# Patient Record
Sex: MALE | Race: WHITE | NOT HISPANIC OR LATINO | Employment: OTHER | URBAN - METROPOLITAN AREA
[De-identification: names, ages, dates, MRNs, and addresses within clinical notes are randomized per-mention and may not be internally consistent; named-entity substitution may affect disease eponyms.]

---

## 2017-01-13 ENCOUNTER — SLEEP CENTER VISIT (OUTPATIENT)
Dept: SLEEP MEDICINE | Facility: MEDICAL CENTER | Age: 62
End: 2017-01-13
Payer: COMMERCIAL

## 2017-01-13 VITALS
RESPIRATION RATE: 16 BRPM | DIASTOLIC BLOOD PRESSURE: 78 MMHG | HEART RATE: 79 BPM | WEIGHT: 248 LBS | BODY MASS INDEX: 38.92 KG/M2 | HEIGHT: 67 IN | SYSTOLIC BLOOD PRESSURE: 110 MMHG

## 2017-01-13 DIAGNOSIS — G47.33 OSA (OBSTRUCTIVE SLEEP APNEA): ICD-10-CM

## 2017-01-13 PROCEDURE — 99203 OFFICE O/P NEW LOW 30 MIN: CPT | Performed by: INTERNAL MEDICINE

## 2017-01-13 RX ORDER — ZOLPIDEM TARTRATE 5 MG/1
TABLET ORAL
Qty: 3 TAB | Refills: 0 | Status: CANCELLED | OUTPATIENT
Start: 2017-01-13

## 2017-01-13 NOTE — PROGRESS NOTES
Chief Complaint   Patient presents with   • New Patient     Sleep evaluation       HPI: This patient is a 61 y.o. Male from [place], [place], who is referred by his PCP for sleep apnea evaluation. He has had snoring and fatigue for many years, with overnight oximetry in June 2016 showing cyclical desaturations below 90% for 93% of the night to a silvia of 53%. He is unaware of apneas. The patient describes frequent nighttime awakening secondary to nocturia and is pending urology evaluation for symptoms. He goes to bed around 9 PM, falling asleep readily, with approximately 5 awakenings nightly. He gets up by 4:30 AM feeling relatively rested however feels fatigued through the day. His [place] Sleepiness Scale score is 9. His BMI is 38. Denies tobacco, marijuana, or excessive alcohol use.      Past Medical History   Diagnosis Date   • CAD (coronary artery disease) 9/23/2011     Based on an abnormal Ca score.    • HTN (hypertension)     • Dyslipidemia     • Left ventricular hypertrophy    • Nephrolithiasis    • Kidney stones      Lithotripsy   • Back pain    • Obesity    • Kidney stone    • Tonsillitis        Social History     Social History   • Marital Status:      Spouse Name: N/A   • Number of Children: N/A   • Years of Education: N/A     Occupational History   • Not on file.     Social History Main Topics   • Smoking status: Former Smoker -- 1.00 packs/day for 5 years   • Smokeless tobacco: Not on file      Comment: Quit 1985   • Alcohol Use: No   • Drug Use: No   • Sexual Activity: Not on file      Comment: , has 4 children, works as [employer] .     Other Topics Concern   • Not on file     Social History Narrative       Family History   Problem Relation Age of Onset   • Other Neg Hx        Current Outpatient Prescriptions on File Prior to Visit   Medication Sig Dispense Refill   • rosuvastatin (CRESTOR) 10 MG Tab Take 1 Tab by mouth every evening. 90 Tab 3   • lisinopril (PRINIVIL) 10 MG  "TABS Take 10 mg by mouth every day.     • Multiple Vitamins-Minerals (MULTI COMPLETE PO) Take  by mouth.     • POTASSIUM CITRATE PO Take  by mouth every day.     • aspirin 81 MG tablet Take 81 mg by mouth every day.     • tamsulosin (FLOMAX) 0.4 MG capsule Take 1 Cap by mouth ONE-HALF HOUR AFTER DINNER. (Patient not taking: Reported on 1/13/2017) 30 Cap 0   • naproxen (NAPROSYN) 500 MG TABS Take 500 mg by mouth 2 times a day, with meals.       No current facility-administered medications on file prior to visit.       Allergies: Review of patient's allergies indicates no known allergies.    ROS:   Constitutional: Denies fevers, chills, night sweats, fatigue or weight loss  Eyes: Denies vision loss, pain, drainage, double vision  Ears, Nose, Throat: Denies earache, difficulty hearing, tinnitus, nasal congestion, hoarseness  Cardiovascular: Denies chest pain, tightness, palpitations, orthopnea or edema  Respiratory: Denies shortness of breath, cough, wheezing, hemoptysis  Sleep: As in HPI  GI: Denies heartburn, dysphagia, nausea, abdominal pain, diarrhea or constipation  : +urination, denies hematuria, discharge or painful urination  Musculoskeletal: Denies back pain, painful joints, +sore muscles  Neurological: Denies weakness or headaches  Skin: No rashes    Blood pressure 110/78, pulse 79, resp. rate 16, height 1.702 m (5' 7\"), weight 112.492 kg (248 lb).  Multi-Ox Readings  Multi Ox #1     O2 sat % at rest     O2 sat % on exertion     O2 sat average on exertion     Multi Ox #2     O2 sat % at rest     O2 sat % on exertion     O2 sat average on exertion       Oxygen Use     Oxygen Frequency     Duration of need     Is the patient mobile within the home?     CPAP Use?     BIPAP Use?     Servo Titration         Physical Exam:  Appearance: Well-nourished, well-developed, in no acute distress  HEENT: Normocephalic, atraumatic, white sclera, PERRLA, oropharynx clear, Mallampati 4  Neck: No adenopathy or " masses  Respiratory: no intercostal retractions or accessory muscle use  Lungs auscultation: Clear to auscultation bilaterally  Cardiovascular: Regular rate rhythm. No murmurs, rubs or gallops.  No LE edema  Abdomen: soft, obese  Gait: Normal  Digits: No clubbing, cyanosis  Motor: No focal deficits  Orientation: Oriented to time, person and place    Diagnosis:  1. MACEY (obstructive sleep apnea)  OVERNIGHT HOME SLEEP STUDY   2. BMI 38.0-38.9,adult         Plan:  The patient has snoring, nocturnal desaturations, in the setting of medically significant excessive weight and a crowded airway, with very high clinical suspicion for obstructive sleep apnea syndrome. He also has nocturia which is disrupting sleep. We discussed the pathophysiology of sleep apnea as well as treatment options. As he lives in [place], [place] we will arrange for overnight home polysomnography and he will call for test results. My suspicion is that he will require CPAP therapy. He was also encouraged to follow up with urology regarding nocturia. We will attempt to coordinate his medical appointments to minimize his commuting.  Thank you for the opportunity to assist in Javon's care.

## 2017-01-13 NOTE — MR AVS SNAPSHOT
"        Javon Roa   2017 8:00 AM   Sleep Center Visit   MRN: [id number]    Department:  [facility]   Dept Phone:  [phone]    Description:  Male : 1955   Provider:  Teresa Lou M.D.           Reason for Visit     New Patient Sleep evaluation      Allergies as of 2017     No Known Allergies      You were diagnosed with     MACEY (obstructive sleep apnea)   [275466]       BMI 38.0-38.9,adult   [048040]         Vital Signs     Blood Pressure Pulse Respirations Height Weight Body Mass Index    110/78 mmHg 79 16 1.702 m (5' 7\") 112.492 kg (248 lb) 38.83 kg/m2    Smoking Status                   Former Smoker           Basic Information     Date Of Birth Sex Race Ethnicity Preferred Language    1955 Male Other Unknown English      Problem List              ICD-10-CM Priority Class Noted - Resolved    CAD (coronary artery disease) (Chronic) I25.10   2011 - Present    Dyslipidemia (Chronic) E78.5   2011 - Present    Left ventricular hypertrophy (Chronic) I51.7   Unknown - Present    Coronary artery calcification seen on CAT scan I25.10   2013 - Present    Obesity E66.9   2013 - Present    Essential hypertension, benign (Chronic) I10   7/15/2016 - Present    Pancreatic mass K86.9 Medium  10/20/2016 - Present    Nephrolithiasis N20.0 Medium  10/20/2016 - Present    Bilirubinemia E80.6 Medium  10/20/2016 - Present    Hyponatremia E87.1 Medium  10/20/2016 - Present      Health Maintenance        Date Due Completion Dates    IMM DTaP/Tdap/Td Vaccine (1 - Tdap) 1974 ---    COLONOSCOPY 2005 ---    IMM ZOSTER VACCINE 2015 ---    IMM INFLUENZA (1) 2016 ---            Current Immunizations     No immunizations on file.      Below and/or attached are the medications your provider expects you to take. Review all of your home medications and newly ordered medications with your provider and/or pharmacist. Follow medication instructions as directed by your " provider and/or pharmacist. Please keep your medication list with you and share with your provider. Update the information when medications are discontinued, doses are changed, or new medications (including over-the-counter products) are added; and carry medication information at all times in the event of emergency situations     Allergies:  No Known Allergies          Medications  Valid as of: January 13, 2017 -  8:58 AM    Generic Name Brand Name Tablet Size Instructions for use    Aspirin (Tab) aspirin 81 MG Take 81 mg by mouth every day.        Lisinopril (Tab) PRINIVIL 10 MG Take 10 mg by mouth every day.        Multiple Vitamins-Minerals   Take  by mouth.        Naproxen (Tab) NAPROSYN 500 MG Take 500 mg by mouth 2 times a day, with meals.        Potassium Citrate   Take  by mouth every day.        Rosuvastatin Calcium (Tab) CRESTOR 10 MG Take 1 Tab by mouth every evening.        Tamsulosin HCl (Cap) FLOMAX 0.4 MG Take 1 Cap by mouth ONE-HALF HOUR AFTER DINNER.        .                 Medicines prescribed today were sent to:     [facility] - [place] [address] [address] [facility] [address]    Phone: [phone] Fax: [phone]    Open 24 Hours?: No    [facility] - [place] [address] [address]    Phone: [phone] Fax: [phone]    Open 24 Hours?: No      Medication refill instructions:       If your prescription bottle indicates you have medication refills left, it is not necessary to call your provider’s office. Please contact your pharmacy and they will refill your medication.    If your prescription bottle indicates you do not have any refills left, you may request refills at any time through one of the following ways: The online [employer] system (except Urgent Care), by calling your provider’s office, or by asking your pharmacy to contact your provider’s office with a refill request. Medication refills are processed only during  regular business hours and may not be available until the next business day. Your provider may request additional information or to have a follow-up visit with you prior to refilling your medication.   *Please Note: Medication refills are assigned a new Rx number when refilled electronically. Your pharmacy may indicate that no refills were authorized even though a new prescription for the same medication is available at the pharmacy. Please request the medicine by name with the pharmacy before contacting your provider for a refill.        Your To Do List     Future Labs/Procedures Complete By Expires    OVERNIGHT HOME SLEEP STUDY  As directed 1/13/2018    Comments:    Pt lives in [place], [place]  Pls call for results         [employer] Access Code: [email or web address]  Expires: 2/12/2017  8:58 AM    [employer]  A secure, online tool to manage your health information     [employer]® is a secure, online tool that connects you to your personalized health information from the privacy of your home -- day or night - making it very easy for you to manage your healthcare. Once the activation process is completed, you can even access your medical information using the [employer] maryam, which is available for free in the Apple Maryam store or Google Play store.     [employer] provides the following levels of access (as shown below):   My Chart Features   [facility] Doctor [facility]  Specialists [facility]  Urgent  Care Non-[facility]  Primary Care  Doctor   Email your healthcare team securely and privately 24/7 X X X    Manage appointments: schedule your next appointment; view details of past/upcoming appointments X      Request prescription refills. X      View recent personal medical records, including lab and immunizations X X X X   View health record, including health history, allergies, medications X X X X   Read reports about your outpatient visits, procedures, consult and ER notes X X X X   See your discharge summary, which is a  recap of your hospital and/or ER visit that includes your diagnosis, lab results, and care plan. X X       How to register for [employer]DA:  1. Go to  [email or web address].  2. Click on the Sign Up Now box, which takes you to the New Member Sign Up page. You will need to provide the following information:  a. Enter your BDA Access Code exactly as it appears at the top of this page. (You will not need to use this code after you’ve completed the sign-up process. If you do not sign up before the expiration date, you must request a new code.)   b. Enter your date of birth.   c. Enter your home email address.   d. Click Submit, and follow the next screen’s instructions.  3. Create a BDA ID. This will be your BDA login ID and cannot be changed, so think of one that is secure and easy to remember.  4. Create a BDA password. You can change your password at any time.  5. Enter your Password Reset Question and Answer. This can be used at a later time if you forget your password.   6. Enter your e-mail address. This allows you to receive e-mail notifications when new information is available in BDA.  7. Click Sign Up. You can now view your health information.    For assistance activating your BDA account, call [phone]

## 2017-07-14 ENCOUNTER — TELEPHONE (OUTPATIENT)
Dept: PULMONOLOGY | Facility: HOSPICE | Age: 62
End: 2017-07-14

## 2017-07-14 DIAGNOSIS — G47.33 OSA (OBSTRUCTIVE SLEEP APNEA): ICD-10-CM

## 2017-07-14 NOTE — TELEPHONE ENCOUNTER
Patient lives in [place], [place] and had overnight pulse oximetry done 1/24/17. He is finally calling for results. (they are faxed into the media tab) the results are ready for review. Patient is calling for results.

## 2017-07-15 NOTE — TELEPHONE ENCOUNTER
He has very severe sleep apnea and low oxygen and recommended to start CPAP therapy as discussed with Dr. Dasha chacon OV. It would be advisable that he complete a sleep study in our clinic to better treat patient, because I suspect he will need additional oxygen and/or CPAP therapy may not be enough. He may in fact need BIPAP.  If he is amendable I can place order to get sleep study and follow up visit the very next day with results due to his commute

## 2017-07-18 NOTE — TELEPHONE ENCOUNTER
"I spoke with patient and he is agreement to come to [facility] and do an \"In Lab sleep study with result appointment the next day. Instructions per Delma GRIGGS. I called and spoke to Genoveva HILL at the sleep center. She will help us get this patient scheduled. She has placed order to [facility] group for signed orders. Message routed to [facility] for signature.  "

## 2017-07-19 NOTE — TELEPHONE ENCOUNTER
Orders placed. Please remind scheduling to make follow up visit, the day after his sleep study due to commute.  F/U visit must be in the afternoon in order for the sleep study to be reviewed with data completely

## 2017-07-20 NOTE — TELEPHONE ENCOUNTER
Message forwarded to Genoveva PERES for help scheduling Sleep Study with next day follow up  Results. (refer to Delma Diana's instructions) Orders placed.

## 2017-10-08 ENCOUNTER — SLEEP STUDY (OUTPATIENT)
Dept: SLEEP MEDICINE | Facility: MEDICAL CENTER | Age: 62
End: 2017-10-08
Attending: INTERNAL MEDICINE
Payer: COMMERCIAL

## 2017-10-08 DIAGNOSIS — G47.33 OSA (OBSTRUCTIVE SLEEP APNEA): ICD-10-CM

## 2017-10-08 PROCEDURE — 95811 POLYSOM 6/>YRS CPAP 4/> PARM: CPT | Performed by: INTERNAL MEDICINE

## 2017-10-09 ENCOUNTER — SLEEP CENTER VISIT (OUTPATIENT)
Dept: SLEEP MEDICINE | Facility: MEDICAL CENTER | Age: 62
End: 2017-10-09
Payer: COMMERCIAL

## 2017-10-09 VITALS
SYSTOLIC BLOOD PRESSURE: 110 MMHG | OXYGEN SATURATION: 95 % | TEMPERATURE: 98.2 F | WEIGHT: 254 LBS | HEIGHT: 67 IN | DIASTOLIC BLOOD PRESSURE: 76 MMHG | RESPIRATION RATE: 16 BRPM | BODY MASS INDEX: 39.87 KG/M2 | HEART RATE: 77 BPM

## 2017-10-09 DIAGNOSIS — G47.33 OSA (OBSTRUCTIVE SLEEP APNEA): ICD-10-CM

## 2017-10-09 PROCEDURE — 99213 OFFICE O/P EST LOW 20 MIN: CPT | Performed by: NURSE PRACTITIONER

## 2017-10-09 NOTE — PROCEDURES
Comments:  The patient underwent a diagnostic polysomnogram using the standard montage for measurement of parameters of sleep, respiratory events, movement abnormalities, and heart rate and rhythm.    A microphone was used to monitor snoring.  Interpretation:  Study start time was 09:39:47 PM.  Diagnostic recording time was 7h 44.5m with a total sleep time of 6h 12.5m resulting in a sleep efficiency of 80.19%%.    Sleep latency from the start of the study was 06 minutes and the latency from sleep to REM was 61 minutes.  In total, 90 arousals were scored for an arousal index of 14.5.  Respiratory:  There were a total of 25 apneas consisting of 2 obstructive apneas, 7 mixed apneas, and 16 central apneas.  A total of 59 hypopneas were scored.  The apnea index was 4.03 per hour and the hypopnea index was 9.50 per hour resulting in an overall AHI of 13.53.  AHI during rem was 14.07 and AHI while supine was 20.50.  Oximetry:  There was a mean oxygen saturation of 90.0% with a minimum oxygen saturation of 72.0%.  Time spent with oxygen saturations below 89% was 105.9 minutes.  Cardiac:  The highest heart rate seen while awake was 91 BPM while the highest heart rate during sleep was 87 BPM with an average sleeping heart rate of 70 BPM.  Limb Movements:  There were a total of 173 PLMs during sleep, of which 15 were PLMS arousals.  This resulted in a PLMS index of 27.9 and a PLMS arousal index of 2.4.     CPAP was tried from 5 to 13 cm H2O.    Narration:    This was a positive airway pressure titration overnight polysomnogram. The sleep stage durations revealed a reduced amount of stage III sleep. The lowest saturation during sleep was 72%. The technician initiated therapy with CPAP at 5 cm of water pressure and gradually increased the pressure to a maximum of 15 cm. The patient appeared to do best on CPAP at 14 cm water where REM sleep was achieved, the apnea hypopnea index was 4.9, and saturations were never less than 80%.  The patient did not do as well on CPAP at 15 cm water nor is well on CPAP at 13 cm water.    Recommendation:    Recommend auto titrating CPAP 12-17 cm water using a mask of choice and heated humidification followed by clinical and data card review. During the CPAP titration, the patient chose to use an AirFit   S mask with heated humidification

## 2017-10-09 NOTE — PROGRESS NOTES
CC:  Here for f/u sleep issues as listed below    HPI:   Javon presents today for follow up obstructive sleep apnea and sleep study results.  Novasom sleep study from 1/2017 indicated an AHI of 67.3 and 71.8 and low oxygenation less than 70% both nights.  He followed up with a titration study 10/2017 that was trialed on CPAP pressures from 5-15. He did best on CPAP pressure of 14 with reduced AHI to 4.9 and REM rebound. He continued to have desaturations below 90% for 21 minutes with this pressure and qualifies for additional oxygen. Reviewed results and patient is amendable to CPAP therapy. They understand they may need a future sleep study if treatment is ineffective.   Patient is currently sleeping 6 hours per night with 2-3 nighttime awakenings. They have no trouble falling asleep. They sometimes feel refreshed in the morning and occasionally morning H/A. They feel tired throughout the day and falls asleep watching TV for appx 10-15 minutes long.  Patient reports snoring, apnea events and paroxysmal nocturnal dyspnea events. They have never fallen asleep in conversation or at work. Had accident appx 2 years ago due to falling asleep.  They deny sleepwalking/talking.       Patient Active Problem List    Diagnosis Date Noted   • Pancreatic mass 10/20/2016     Priority: Medium   • Nephrolithiasis 10/20/2016     Priority: Medium   • Bilirubinemia 10/20/2016     Priority: Medium   • Hyponatremia 10/20/2016     Priority: Medium   • MACEY (obstructive sleep apnea) 10/09/2017   • BMI 39.0-39.9,adult 10/09/2017   • Essential hypertension, benign 07/15/2016   • Coronary artery calcification seen on CAT scan 08/16/2013   • Obesity 08/16/2013   • Left ventricular hypertrophy    • CAD (coronary artery disease) 09/23/2011   • Dyslipidemia 09/23/2011       Past Medical History:   Diagnosis Date   • CAD (coronary artery disease) 9/23/2011    Based on an abnormal Ca score.    • Back pain    • Dyslipidemia     • HTN (hypertension)      • Kidney stone    • Kidney stones     Lithotripsy   • Left ventricular hypertrophy    • Nephrolithiasis    • Obesity    • Tonsillitis        Past Surgical History:   Procedure Laterality Date   • LITHOTRIPSY      Dr. Jose Garcia, urologist   • TONSILLECTOMY         Family History   Problem Relation Age of Onset   • Other Neg Hx        Social History     Social History   • Marital status:      Spouse name: N/A   • Number of children: N/A   • Years of education: N/A     Occupational History   • Not on file.     Social History Main Topics   • Smoking status: Former Smoker     Packs/day: 1.00     Years: 5.00     Quit date: 1/1/1985   • Smokeless tobacco: Never Used      Comment: Quit 1985   • Alcohol use No   • Drug use: No   • Sexual activity: Not on file      Comment: , has 4 children, works as [employer] .     Other Topics Concern   • Not on file     Social History Narrative   • No narrative on file       Current Outpatient Prescriptions   Medication Sig Dispense Refill   • tamsulosin (FLOMAX) 0.4 MG capsule Take 1 Cap by mouth ONE-HALF HOUR AFTER DINNER. (Patient not taking: Reported on 1/13/2017) 30 Cap 0   • rosuvastatin (CRESTOR) 10 MG Tab Take 1 Tab by mouth every evening. 90 Tab 3   • lisinopril (PRINIVIL) 10 MG TABS Take 10 mg by mouth every day.     • naproxen (NAPROSYN) 500 MG TABS Take 500 mg by mouth 2 times a day, with meals.     • Multiple Vitamins-Minerals (MULTI COMPLETE PO) Take  by mouth.     • POTASSIUM CITRATE PO Take  by mouth every day.     • aspirin 81 MG tablet Take 81 mg by mouth every day.       No current facility-administered medications for this visit.           Allergies: Review of patient's allergies indicates no known allergies.      ROS   Gen: Denies fever, chills, unintentional weight loss, fatigue  Resp:Denies Dyspnea  CV: Denies chest pain, chest tightness  Sleep:Denies morning headache, insomnia,   Neuro: Denies frequent headaches, weakness,  "dizziness  See HPI.  All other systems reviewed and negative        Vital signs for this encounter:  Vitals:    10/09/17 1119   Height: 1.702 m (5' 7\")   Weight: 115.2 kg (254 lb)   Weight % change since last entry.: 0 %   BP: 110/76   Pulse: 77   BMI (Calculated): 39.78   Resp: 16   Temp: 36.8 °C (98.2 °F)   O2 sat % room air: 95 %                   Physical Exam:   Gen:         Alert and oriented, No apparent distress.   Neck:        No Lymphadenopathy.  Lungs:     Clear to auscultation bilaterally.    CV:          Regular rate and rhythm. No murmurs, rubs or gallops.   Abd:         Soft non tender, non distended.            Ext:          No clubbing, cyanosis, edema.    Assessment   1. MACEY (obstructive sleep apnea)  DME CPAP   2. BMI 39.0-39.9,adult         PLAN:   Patient Instructions   1) Start CPAP at 10mmI60 with 2L of oxygen  2) Discussed acclimating to machine and change mask within first 30 days if required. Bring machine to first appointment.  3) Vaccines: Flu recommended  4) Return in about 6 weeks (around 11/20/2017) for Compliance, review of symptoms, if not sooner, follow up with ANTHONY Alejandre.          "

## 2017-10-09 NOTE — PATIENT INSTRUCTIONS
1) Start CPAP at [id number] with 2L of oxygen  2) Discussed acclimating to machine and change mask within first 30 days if required. Bring machine to first appointment.  3) Vaccines: Flu recommended  4) Return in about 6 weeks (around 11/20/2017) for Compliance, review of symptoms, if not sooner, follow up with ANTHONY Alejandre.

## 2017-11-21 ENCOUNTER — SLEEP CENTER VISIT (OUTPATIENT)
Dept: SLEEP MEDICINE | Facility: MEDICAL CENTER | Age: 62
End: 2017-11-21
Payer: COMMERCIAL

## 2017-11-21 VITALS
WEIGHT: 258 LBS | HEIGHT: 67 IN | DIASTOLIC BLOOD PRESSURE: 60 MMHG | HEART RATE: 63 BPM | BODY MASS INDEX: 40.49 KG/M2 | SYSTOLIC BLOOD PRESSURE: 136 MMHG | RESPIRATION RATE: 18 BRPM | OXYGEN SATURATION: 93 % | TEMPERATURE: 98.2 F

## 2017-11-21 DIAGNOSIS — G47.33 OBSTRUCTIVE SLEEP APNEA SYNDROME: ICD-10-CM

## 2017-11-21 DIAGNOSIS — G47.10 HYPERSOMNOLENCE: ICD-10-CM

## 2017-11-21 PROCEDURE — 99214 OFFICE O/P EST MOD 30 MIN: CPT | Performed by: INTERNAL MEDICINE

## 2017-12-05 NOTE — PROGRESS NOTES
CC:  Daytime somnolence, sleep apnea hypopnea syndrome.    HPI:   Mr. Roa has been evaluated here for snoring and excessive daytime somnolence.  Home sleep testing in January 2017 demonstrated an apnea hypopnea index of 67-71.8 events per hour with arterial oxygen saturation is low as 70% on room air.  A titration polysomnogram in October 2017 demonstrated a good response to CPAP at 14 cm water pressure with a residual apnea hypopnea index was 4.9 events per hour.  Some residual hypoxemia is noted on optimal CPAP therapy and supplemental oxygen was added as well.    He has acclimated well to treatment and is using the CPAP and oxygen each night, throughout the night.  He is not having unusual problems with mask fit using a Simplus fullface mask, or airway dryness.  He enjoys improved levels of daytime alertness and is not napping or falling asleep inappropriately.    The CPAP data recording chip information is reviewed with the patient.  It demonstrates use on each of the last 34 days with an average daily usage of 7 hours and 10 minutes.  Leak is moderate and the estimated residual apnea hypopnea index is 3.6 events per hour.        Patient Active Problem List    Diagnosis Date Noted   • Pancreatic mass 10/20/2016     Priority: Medium   • Nephrolithiasis 10/20/2016     Priority: Medium   • Bilirubinemia 10/20/2016     Priority: Medium   • Hyponatremia 10/20/2016     Priority: Medium   • MACEY (obstructive sleep apnea) 10/09/2017   • BMI 39.0-39.9,adult 10/09/2017   • Essential hypertension, benign 07/15/2016   • Coronary artery calcification seen on CAT scan 08/16/2013   • Obesity 08/16/2013   • Left ventricular hypertrophy    • CAD (coronary artery disease) 09/23/2011   • Dyslipidemia 09/23/2011       Past Medical History:   Diagnosis Date   • Back pain    • CAD (coronary artery disease) 9/23/2011    Based on an abnormal Ca score.    • Dyslipidemia     • HTN (hypertension)     • Kidney stone    • Kidney stones   "   Lithotripsy   • Left ventricular hypertrophy    • Nephrolithiasis    • Obesity    • Tonsillitis        Past Surgical History:   Procedure Laterality Date   • LITHOTRIPSY      Dr. Jose Garcia, urologist   • TONSILLECTOMY         Family History   Problem Relation Age of Onset   • Other Neg Hx        Social History     Social History   • Marital status:      Spouse name: N/A   • Number of children: N/A   • Years of education: N/A     Occupational History   • Not on file.     Social History Main Topics   • Smoking status: Former Smoker     Packs/day: 1.00     Years: 5.00     Quit date: 1/1/1985   • Smokeless tobacco: Never Used      Comment: Quit 1985   • Alcohol use No   • Drug use: No   • Sexual activity: Not on file      Comment: , has 4 children, works as [email or web address] .     Other Topics Concern   • Not on file     Social History Narrative   • No narrative on file       Current Outpatient Prescriptions   Medication Sig Dispense Refill   • rosuvastatin (CRESTOR) 10 MG Tab Take 1 Tab by mouth every evening. 90 Tab 3   • lisinopril (PRINIVIL) 10 MG TABS Take 10 mg by mouth every day.     • Multiple Vitamins-Minerals (MULTI COMPLETE PO) Take  by mouth.     • aspirin 81 MG tablet Take 81 mg by mouth every day.     • tamsulosin (FLOMAX) 0.4 MG capsule Take 1 Cap by mouth ONE-HALF HOUR AFTER DINNER. (Patient not taking: Reported on 11/21/2017) 30 Cap 0     No current facility-administered medications for this visit.     \"CURRENT RX\"      Allergies: Patient has no known allergies.      ROS  Reviewed and unchanged from prior.      Physical Exam:   /60   Pulse 63   Temp 36.8 °C (98.2 °F)   Resp 18   Ht 1.702 m (5' 7\")   Wt 117 kg (258 lb)   SpO2 93%   BMI 40.41 kg/m²    Head and neck examination demonstrates no mucosal lesion, purulent drainage or evident polyps. The pharynx is benign with a Mallampati III presentation. The neck is supple without thyromegaly. On chest examination there " are symmetrical bilateral breath sounds without rales, wheezing or consolidation. On cardiac examination, the apical impulse and heart sounds are normal and the rhythm is regular. There is no murmur, gallop or rub and no jugular venous distention. The abdomen is soft with active bowel sounds and no palpable hepatosplenomegaly, mass, guarding or rebound. The extremities show no clubbing, cyanosis or edema and no signs of deep venous thrombosis. There is no warmth, redness, tenderness or palpable venous cord in the calves. The skin is clear, warm and dry. There is no unusual peripheral lymphadenopathy. Peripheral pulses are palpable in all 4 extremities. On neurologic examination, cranial nerve function is intact, motor tone is symmetrical, and the patient is alert, oriented and responsive.       Problems:  1. Obstructive sleep apnea syndrome  He has severe obstructive sleep apnea hypopnea with an apnea hypopnea index of about 70 events per hour and arterial oxygen saturation as low as 70% on room air.  He is acclimated well to CPAP treatment.  He is using the device with oxygen each night, throughout the night, as confirmed by the data recording chip.  He enjoys improve levels of daytime alertness.    2. Hypersomnolence  Improved.      Plan:   1.  Continue CPAP at 14 cm water pressure with option at 2 L/m.  A prescription is written for a new Simplus fullface mask and supplies as needed.    2.  Continuous nocturnal oximetry will be arranged on CPAP and oxygen therapy confirm preservation a saturation at night.    3.  Return visit here in about 6 months, bringing the CPAP data recording chip with him.    We appreciate the opportunity to assist in his care.

## 2018-05-24 ENCOUNTER — APPOINTMENT (OUTPATIENT)
Dept: SLEEP MEDICINE | Facility: MEDICAL CENTER | Age: 63
End: 2018-05-24
Payer: COMMERCIAL

## 2018-06-15 ENCOUNTER — SLEEP CENTER VISIT (OUTPATIENT)
Dept: SLEEP MEDICINE | Facility: MEDICAL CENTER | Age: 63
End: 2018-06-15
Payer: COMMERCIAL

## 2018-06-15 VITALS
SYSTOLIC BLOOD PRESSURE: 118 MMHG | DIASTOLIC BLOOD PRESSURE: 70 MMHG | BODY MASS INDEX: 41.44 KG/M2 | WEIGHT: 264 LBS | HEART RATE: 85 BPM | RESPIRATION RATE: 16 BRPM | HEIGHT: 67 IN | OXYGEN SATURATION: 92 %

## 2018-06-15 DIAGNOSIS — G47.33 OSA (OBSTRUCTIVE SLEEP APNEA): ICD-10-CM

## 2018-06-15 PROCEDURE — 99213 OFFICE O/P EST LOW 20 MIN: CPT | Performed by: NURSE PRACTITIONER

## 2018-06-15 NOTE — PROGRESS NOTES
"Chief Complaint   Patient presents with   • Follow-Up     MACEY, 6MO FV         HPI: This patient is a 62 y.o. male, who presents for annual follow-up of obstructive sleep apnea.     Home sleep study January 2017 indicates severe sleep apnea with an AHI of 67, minimum oxygen saturation of 70 %. he is compliant with CPAP 14 cm H2O with 2 L of O2 bleed in. Compliance download over the past 30 days indicates 100 % compliance, average use of 7 hours 11 minutes per night, AHI of 2.8. Mask and pressures are comfortable.  He does have a fairly significant air leak.  Mask fitting is performed today.  Patient requires the use of chinstrap with his mask.  He does however feel he gets a better quality sleep.  He wakes feeling more rested.  Denies a.m. headache.    Past Medical History:   Diagnosis Date   • Back pain    • CAD (coronary artery disease) 9/23/2011    Based on an abnormal Ca score.    • Dyslipidemia     • HTN (hypertension)     • Kidney stone    • Kidney stones     Lithotripsy   • Left ventricular hypertrophy    • Nephrolithiasis    • Obesity    • Tonsillitis        Social History   Substance Use Topics   • Smoking status: Former Smoker     Packs/day: 1.00     Years: 5.00     Quit date: 1/1/1985   • Smokeless tobacco: Never Used      Comment: Quit 1985   • Alcohol use No       Family History   Problem Relation Age of Onset   • Other Neg Hx        Current medications as of today   Current Outpatient Prescriptions   Medication Sig Dispense Refill   • rosuvastatin (CRESTOR) 10 MG Tab Take 1 Tab by mouth every evening. 90 Tab 3   • lisinopril (PRINIVIL) 10 MG TABS Take 10 mg by mouth every day.     • Multiple Vitamins-Minerals (MULTI COMPLETE PO) Take  by mouth.     • aspirin 81 MG tablet Take 81 mg by mouth every day.       No current facility-administered medications for this visit.        Allergies: Patient has no known allergies.    Blood pressure 118/70, pulse 85, resp. rate 16, height 1.702 m (5' 7\"), weight 119.7 " kg (264 lb), SpO2 92 %.      ROS: As per HPI and otherwise negative if not stated.      Physical exam:   Constitutional: Obese, in no acute distress  Eyes: PERRL  Neck: supple, trachea midline  Respiratory: no intercostal retractions or accessory muscle use   Lungs auscultation: Clear to auscultation bilaterally  Cardiovascular: Regular rate rhythm no murmurs, rubs or gallops  Musculoskeletal: no clubbing or cyanosis  Skin: No rashes or lesions noted on exposed skin  Neuro: No focal deficit noted  Psychiatric: Oriented to time, person and place.     Diagnosis:  1. MACEY (obstructive sleep apnea)  DME MASK AND SUPPLIES    MASK FITTING    DME [employer]   2. BMI 39.0-39.9,adult  HEIGHT AND WEIGHT       Plan:  1.  Mask fitting performed today  2.  Continue CPAP with O2 bleed in nightly  3.  Prescription for new supplies to airway medical in [place]  4.  Overnight oximetry through airway, okay to call for results  5.  Follow-up in 1 year, sooner if needed  6.  It is recommended that he clean mask and tubing weekly  7.  Follow-up with PCP for routine health maintenance  8. Patient's body mass index is 41.35 kg/m². Exercise and nutrition counseling were performed at this visit.

## 2022-03-15 ENCOUNTER — HOSPITAL ENCOUNTER (OUTPATIENT)
Facility: MEDICAL CENTER | Age: 67
End: 2022-03-15
Attending: INTERNAL MEDICINE | Admitting: INTERNAL MEDICINE
Payer: COMMERCIAL

## 2022-03-21 ENCOUNTER — PRE-ADMISSION TESTING (OUTPATIENT)
Dept: ADMISSIONS | Facility: MEDICAL CENTER | Age: 67
End: 2022-03-21
Attending: INTERNAL MEDICINE
Payer: COMMERCIAL

## 2022-03-21 VITALS — BODY MASS INDEX: 38.45 KG/M2 | WEIGHT: 245 LBS | HEIGHT: 67 IN

## 2022-03-21 RX ORDER — TADALAFIL 5 MG/1
5 TABLET ORAL PRN
COMMUNITY

## 2022-05-18 ENCOUNTER — PRE-ADMISSION TESTING (OUTPATIENT)
Dept: ADMISSIONS | Facility: MEDICAL CENTER | Age: 67
End: 2022-05-18
Attending: INTERNAL MEDICINE
Payer: COMMERCIAL

## 2022-05-18 RX ORDER — TADALAFIL 5 MG/1
TABLET ORAL
COMMUNITY
Start: 2022-01-20 | End: 2022-05-18

## 2022-05-18 NOTE — OR NURSING
"Preadmit appointment: \" Preparing for your Procedure information\" sheet given to patient with verbal and written instructions. Patient instructed to continue prescribed medications through the day before surgery, instructed to take the following medications the day of surgery per anesthesia protocol:            Verbal and written, and pre-admit video website instructions provided on covid symptoms to watch for given to patient, pt advised to notify MD if any symptoms develop. Verbal instructions given to follow the [place] mask mandate and encouraged to avoid crowds. Also verbally instructed to wear a mask when with person known not to have had the Covid vaccine.    Labs and EKG order request FAX to Dr. Lizama for BMP, Covid PCR, and EKG to be done at [facility] in [place], [place] FAX no. [phone] [facility] no. [phone]. Pt will go in the week of 025/23/22. Request pt to have results and EKG tracing FAX to [phone].    STOP/BANG protocol initiated. Verbal instructions given to bring CPAP machine DOS; Pt verbalized he will do so    "

## 2022-06-03 ENCOUNTER — ANESTHESIA (OUTPATIENT)
Dept: SURGERY | Facility: MEDICAL CENTER | Age: 67
End: 2022-06-03
Payer: COMMERCIAL

## 2022-06-03 ENCOUNTER — HOSPITAL ENCOUNTER (OUTPATIENT)
Facility: MEDICAL CENTER | Age: 67
End: 2022-06-03
Attending: INTERNAL MEDICINE | Admitting: INTERNAL MEDICINE
Payer: COMMERCIAL

## 2022-06-03 ENCOUNTER — ANESTHESIA EVENT (OUTPATIENT)
Dept: SURGERY | Facility: MEDICAL CENTER | Age: 67
End: 2022-06-03
Payer: COMMERCIAL

## 2022-06-03 VITALS
DIASTOLIC BLOOD PRESSURE: 73 MMHG | BODY MASS INDEX: 36.68 KG/M2 | SYSTOLIC BLOOD PRESSURE: 128 MMHG | WEIGHT: 233.69 LBS | TEMPERATURE: 97.2 F | RESPIRATION RATE: 14 BRPM | HEIGHT: 67 IN | OXYGEN SATURATION: 93 % | HEART RATE: 54 BPM

## 2022-06-03 DIAGNOSIS — K86.89 PANCREATIC MASS: ICD-10-CM

## 2022-06-03 LAB
FUNGUS SPEC FUNGUS STN: NORMAL
GRAM STN SPEC: NORMAL
PATHOLOGY CONSULT NOTE: NORMAL
SIGNIFICANT IND 70042: NORMAL
SIGNIFICANT IND 70042: NORMAL
SITE SITE: NORMAL
SITE SITE: NORMAL
SOURCE SOURCE: NORMAL
SOURCE SOURCE: NORMAL

## 2022-06-03 PROCEDURE — 87116 MYCOBACTERIA CULTURE: CPT

## 2022-06-03 PROCEDURE — 160207 HCHG ENDO MINUTES - EA ADDL 1 MIN LEVEL 3: Performed by: INTERNAL MEDICINE

## 2022-06-03 PROCEDURE — 160046 HCHG PACU - 1ST 60 MINS PHASE II: Performed by: INTERNAL MEDICINE

## 2022-06-03 PROCEDURE — 88305 TISSUE EXAM BY PATHOLOGIST: CPT | Mod: 59

## 2022-06-03 PROCEDURE — 00731 ANES UPR GI NDSC PX NOS: CPT | Performed by: ANESTHESIOLOGY

## 2022-06-03 PROCEDURE — 88307 TISSUE EXAM BY PATHOLOGIST: CPT

## 2022-06-03 PROCEDURE — 160035 HCHG PACU - 1ST 60 MINS PHASE I: Performed by: INTERNAL MEDICINE

## 2022-06-03 PROCEDURE — 87075 CULTR BACTERIA EXCEPT BLOOD: CPT

## 2022-06-03 PROCEDURE — 700105 HCHG RX REV CODE 258: Performed by: INTERNAL MEDICINE

## 2022-06-03 PROCEDURE — 88312 SPECIAL STAINS GROUP 1: CPT

## 2022-06-03 PROCEDURE — 160202 HCHG ENDO MINUTES - 1ST 30 MINS LEVEL 3: Performed by: INTERNAL MEDICINE

## 2022-06-03 PROCEDURE — 160009 HCHG ANES TIME/MIN: Performed by: INTERNAL MEDICINE

## 2022-06-03 PROCEDURE — 700111 HCHG RX REV CODE 636 W/ 250 OVERRIDE (IP): Performed by: INTERNAL MEDICINE

## 2022-06-03 PROCEDURE — 160002 HCHG RECOVERY MINUTES (STAT): Performed by: INTERNAL MEDICINE

## 2022-06-03 PROCEDURE — 700111 HCHG RX REV CODE 636 W/ 250 OVERRIDE (IP): Performed by: ANESTHESIOLOGY

## 2022-06-03 PROCEDURE — 87070 CULTURE OTHR SPECIMN AEROBIC: CPT

## 2022-06-03 PROCEDURE — 87102 FUNGUS ISOLATION CULTURE: CPT

## 2022-06-03 PROCEDURE — 87205 SMEAR GRAM STAIN: CPT

## 2022-06-03 PROCEDURE — 160025 RECOVERY II MINUTES (STATS): Performed by: INTERNAL MEDICINE

## 2022-06-03 PROCEDURE — 160048 HCHG OR STATISTICAL LEVEL 1-5: Performed by: INTERNAL MEDICINE

## 2022-06-03 RX ORDER — DIPHENHYDRAMINE HYDROCHLORIDE 50 MG/ML
12.5 INJECTION INTRAMUSCULAR; INTRAVENOUS
Status: DISCONTINUED | OUTPATIENT
Start: 2022-06-03 | End: 2022-06-03 | Stop reason: HOSPADM

## 2022-06-03 RX ORDER — HALOPERIDOL 5 MG/ML
1 INJECTION INTRAMUSCULAR
Status: DISCONTINUED | OUTPATIENT
Start: 2022-06-03 | End: 2022-06-03 | Stop reason: HOSPADM

## 2022-06-03 RX ORDER — ONDANSETRON 2 MG/ML
4 INJECTION INTRAMUSCULAR; INTRAVENOUS
Status: DISCONTINUED | OUTPATIENT
Start: 2022-06-03 | End: 2022-06-03 | Stop reason: HOSPADM

## 2022-06-03 RX ORDER — LORAZEPAM 2 MG/ML
0.5 INJECTION INTRAMUSCULAR
Status: DISCONTINUED | OUTPATIENT
Start: 2022-06-03 | End: 2022-06-03 | Stop reason: HOSPADM

## 2022-06-03 RX ORDER — OXYCODONE HCL 5 MG/5 ML
5 SOLUTION, ORAL ORAL
Status: DISCONTINUED | OUTPATIENT
Start: 2022-06-03 | End: 2022-06-03 | Stop reason: HOSPADM

## 2022-06-03 RX ORDER — SODIUM CHLORIDE, SODIUM LACTATE, POTASSIUM CHLORIDE, CALCIUM CHLORIDE 600; 310; 30; 20 MG/100ML; MG/100ML; MG/100ML; MG/100ML
INJECTION, SOLUTION INTRAVENOUS CONTINUOUS
Status: DISCONTINUED | OUTPATIENT
Start: 2022-06-03 | End: 2022-06-03 | Stop reason: HOSPADM

## 2022-06-03 RX ORDER — AMOXICILLIN AND CLAVULANATE POTASSIUM 875; 125 MG/1; MG/1
1 TABLET, FILM COATED ORAL 2 TIMES DAILY
Qty: 6 TABLET | Refills: 0 | Status: SHIPPED | OUTPATIENT
Start: 2022-06-03 | End: 2022-06-06

## 2022-06-03 RX ORDER — OXYCODONE HCL 5 MG/5 ML
10 SOLUTION, ORAL ORAL
Status: DISCONTINUED | OUTPATIENT
Start: 2022-06-03 | End: 2022-06-03 | Stop reason: HOSPADM

## 2022-06-03 RX ORDER — MIDAZOLAM HYDROCHLORIDE 1 MG/ML
INJECTION INTRAMUSCULAR; INTRAVENOUS PRN
Status: DISCONTINUED | OUTPATIENT
Start: 2022-06-03 | End: 2022-06-03 | Stop reason: SURG

## 2022-06-03 RX ORDER — MEPERIDINE HYDROCHLORIDE 25 MG/ML
6.25 INJECTION INTRAMUSCULAR; INTRAVENOUS; SUBCUTANEOUS
Status: DISCONTINUED | OUTPATIENT
Start: 2022-06-03 | End: 2022-06-03 | Stop reason: HOSPADM

## 2022-06-03 RX ADMIN — PIPERACILLIN AND TAZOBACTAM 3.38 G: 3; .375 INJECTION, POWDER, LYOPHILIZED, FOR SOLUTION INTRAVENOUS; PARENTERAL at 08:31

## 2022-06-03 RX ADMIN — PROPOFOL 100 MCG/KG/MIN: 10 INJECTION, EMULSION INTRAVENOUS at 08:39

## 2022-06-03 RX ADMIN — FENTANYL CITRATE 50 MCG: 50 INJECTION, SOLUTION INTRAMUSCULAR; INTRAVENOUS at 08:39

## 2022-06-03 RX ADMIN — SODIUM CHLORIDE, POTASSIUM CHLORIDE, SODIUM LACTATE AND CALCIUM CHLORIDE: 600; 310; 30; 20 INJECTION, SOLUTION INTRAVENOUS at 07:46

## 2022-06-03 RX ADMIN — MIDAZOLAM HYDROCHLORIDE 2 MG: 1 INJECTION, SOLUTION INTRAMUSCULAR; INTRAVENOUS at 08:36

## 2022-06-03 ASSESSMENT — PAIN SCALES - GENERAL: PAIN_LEVEL: 0

## 2022-06-03 ASSESSMENT — PAIN DESCRIPTION - PAIN TYPE: TYPE: CHRONIC PAIN

## 2022-06-03 NOTE — OR NURSING
GASTROSCOPY  Angel Lizama M.D.  Cory Duke M.D.  ---------------------------------------------------------  0927: Pt arrived from endo via gurney to PACU.  Pt is resting comfortably, no s/sx distress, VSS.  Plan to keep pt in PACU for full hour per STOPBANG protocol.   ABX completed infusion.     0935: No changes.     0940:  Pt is awake, alert, opens eyes spontaneously.  Denies pain or nausea. Drinking sips of water.  Clear speech. AOx4. Pt stated that he is spending the night here in [place] and will go home to [place] tomorrow.     0950: VSS.  Left message on wife's phone with update. Pt used urinal, clear yellow UOP. Wife called back and spoke with her; update given.     1000: No changes.     1006:  Per anesthesiologist, will stop 'stop-bang'; pt is doing well on RA, AO, no complaints.     1011: Pt transferred to stage 2.  Report given to Isabell UGARTE.  Pt is AO, very pleasant.

## 2022-06-03 NOTE — PROCEDURES
Echoendoscope/Esophagogastroduodenoscopy  Date of Procedure: 6/3/2022  Attending Physician: Angel Lizama MD    Indications: Pancreatic lesion  Instrument: Olympus Echoendoscope  Sedation:     Anesthesiologist/Type of Anesthesia:  Anesthesiologist: Yakelin Scanlon M.D.; Cory Duke M.D./YOLANDA    Surgical Staff:  Kennyulator: Laney Burkett R.N.  Endoscopy Technician: Polina Sandoval    Specimens removed if any:  ID Type Source Tests Collected by Time Destination   1 : Tail of pancreas- interpace Tissue Pancreas AFB CULTURE, FUNGAL CULTURE, AEROBIC/ANAEROBIC CULTURE (SURGERY) Angel Lizama M.D. 6/3/2022  9:01 AM    A : Duodenum biopsy Tissue Gastric PATHOLOGY SPECIMEN Angel Lizama M.D. 6/3/2022  8:50 AM    B : Duodenal polyp Polyp Gastric PATHOLOGY SPECIMEN Angel Lizama M.D. 6/3/2022  8:52 AM    C : Gastric Biopsy Tissue Gastric PATHOLOGY SPECIMEN Angel Lizama M.D. 6/3/2022  8:53 AM    D : GE junction @39, rule out Barretts Tissue Gastric PATHOLOGY SPECIMEN Angel Lizama M.D. 6/3/2022  8:54 AM    E : Buccal swab Tissue Mouth PATHOLOGY SPECIMEN Angel Lizama M.D. 6/3/2022  9:01 AM    F : Tail of pancreas core Tissue Pancreas PATHOLOGY SPECIMEN Angel Lizama M.D. 6/3/2022  9:02 AM          Pre-Anesthesia Assessment:  Prior to the procedure, a History and Physical was performed, and patient medications and allergies were reviewed. The patient’s tolerance of previous anesthesia was also reviewed. The risks and benefits of the procedure and the sedation options and risks were discussed with the patient including but not limited to infection, bleeding, aspiration, perforation, adverse medication reaction, missed diagnosis,  pancreatitis, and missed lesions. The patient verbalized understanding. All questions were answered, and informed consent was obtained.     After I obtained informed consent from the patient, the patient was placed in the left lateral position. Appropriate time-out protocol was followed: the  correct patient, the correct procedure, and the correct equipment in the room were confirmed. Throughout the procedure, the patient’s blood pressure, pulse, and oxygen saturations were monitored continuously. The echoendoscope was inserted through the oropharynx, esophagus intubated, then advanced to the gastrointestinal tract.  Findings and interventions were performed and documented below. Air was then withdrawn and the echoendoscope was removed. The patient tolerated the procedure well. There were no immediate postoperative complications.     Zosyn 3.375g IV given before procedure      Findings:    EGD:  Esophagus: Normal proximal mid and distal esophagus.  Irregular Z-line at 39 cm suggestive of possible short segment Mcnally's esophagus.  Biopsies taken.  Hiatus at 40 cm  Stomach: Mild gastric erythema in the antrum.  Biopsies taken.  Retroflexion appeared normal  Duodenum: Small duodenal polyp in the bulb 3 mm in size removed with cold forcep.  Biopsy of the duodenum performed.  Ampulla appeared normal.    EUS:  Endoscopic exam with the side viewing echoendoscope was normal. The major papilla was normal endoscopically and sonographically.    The bile duct was non-dilated and measured 3 mm in maximal diameter. The gallbladder was normal. There were no stones or sludge.    The pancreatic parenchyma appeared normal. There were no features of chronic pancreatitis. No stones were visualized in the pancreatic parenchyma. The pancreatic duct measured 3 mm in the head, 1.3m at the tail of the pancreas. There was a honeycomb appearing slightly hypoechoic lesion measuring at least 22.6 x 19.2 mm in size. There appears to be multiple small vascularity flows within the lesion on Doppler US. The lesion was not adjacent any large vessel.  There were some anechoic cystic area adjacent to the lesion.  Doppler ultrasound was utilized to ensure no intervening vessel. Utilizing 22-gauge fine-needle biopsy needle aspiration of  the cystic structure was performed and sent for Intropaste analysis.  Additionally 3 additional passes were performed of the lesion each with multiple throws.           No lymph nodes were seen in the upper abdomen and mediastinum.    No masses were seen in the visualized portions of the liver.      Impressions:   1.  Vascular hypoechoic irregular lesion at the tail the pancreas measuring at least 22.6 x 19.2 mm in size with some small adjacent anechoic cyst. Fine needle biopsy and aspiration performed.  2.  Small duodenal bulb polyp 3 mm removed  3.  Irregular Z-line at 39 cm    4.  Biopsy of duodenum stomach and GE junction performed    Recommendations:   1. Monitor postprocedure complication including perforation bleeding pancreatitis  2. Await biopsy  3. Augmentin 875mg BID for 3 days  4. Follow-up with referring physician       This note was generated using voice recognition software which has a small chance of producing errors of grammar and possibly content. I have made every reasonable attempt to find and correct any obvious errors, but expect that some may not be found prior to finalization of this note

## 2022-06-03 NOTE — OR NURSING
1011: Patient arrived to stage 2 after receiving report.     1020: Patient dressed and resting in recliner. Patients wife brought into stage 2. Dr. Lizama at bedside updating patient and patients wife.    1030: Discharge instructions reviewed with patient and patients wife. Both verbalize understanding. PIV removed. All belongings returned to patient. Discharged home into care of responsible adult.

## 2022-06-03 NOTE — DISCHARGE INSTRUCTIONS
Impressions:   1.  Vascular hypoechoic irregular lesion at the tail the pancreas measuring at least 22.6 x 19.2 mm in size with some small adjacent anechoic cyst. Fine needle biopsy and aspiration performed.  2.  Small duodenal bulb polyp 3 mm removed  3.  Irregular Z-line at 39 cm    4.  Biopsy of duodenum stomach and GE junction performed     Recommendations:   1. Monitor postprocedure complication including perforation bleeding pancreatitis  2. Await biopsy  3. Augmentin 875mg BID for 3 days  4. Follow-up with referring physician    ENDOSCOPY HOME CARE INSTRUCTIONS    GASTROSCOPY OR ERCP  1. Don't eat or drink anything for about an hour after the test. You can then resume your regular diet.  2. Don't drive or drink alcohol for 24 hours. The medication you received will make you too drowsy.  3. Don't take any coffee, tea, or aspirin products until after you see your doctor. These can harm the lining of your stomach.  4. If you begin to vomit bloody material, or develop black or bloody stools, call your doctor as soon as possible.  5. If you have any neck, chest, abdominal pain or temp of 100 degrees, call your doctor.  6. See your doctor for follow-up; call to schedule.    You should call 911 if you develop problems with breathing or chest pain.  If any questions arise, call your doctor. If your doctor is not available, please feel free to call [phone]. You can also call the HEALTH HOTLINE open 24 hours/day, 7 days/week and speak to a nurse at [phone], or toll free [phone].    Depression / Suicide Risk    As you are discharged from this [facility] facility, it is important to learn how to keep safe from harming yourself.    Recognize the warning signs:  Abrupt changes in personality, positive or negative- including increase in energy   Giving away possessions  Change in eating patterns- significant weight changes-  positive or negative  Change in sleeping patterns- unable to sleep or  sleeping all the time   Unwillingness or inability to communicate  Depression  Unusual sadness, discouragement and loneliness  Talk of wanting to die  Neglect of personal appearance   Rebelliousness- reckless behavior  Withdrawal from people/activities they love  Confusion- inability to concentrate     If you or a loved one observes any of these behaviors or has concerns about self-harm, here's what you can do:  Talk about it- your feelings and reasons for harming yourself  Remove any means that you might use to hurt yourself (examples: pills, rope, extension cords, firearm)  Get professional help from the community (Mental Health, Substance Abuse, psychological counseling)  Do not be alone:Call your Safe Contact- someone whom you trust who will be there for you.  Call your local CRISIS HOTLINE 347-5947 or [phone]  Call your local Children's Mobile Crisis Response Team [place] (150) 837-4270 or www.[employer]  Call the toll free National Suicide Prevention Hotlines   National Suicide Prevention Lifeline 870-094-UNCC (8193)  [place] [facility] Line Network 800-SUICIDE (598-0721)    I acknowledge receipt and understanding of these Home Care Instructions.

## 2022-06-03 NOTE — OR NURSING
0658: Brought patient back to pre-op and assumed care.  0752: Patient allergies and NPO status verified, home medication reconciliation completed and belongings secured. Patient verbalizes understanding of pain scale, expected course of stay and plan of care. Surgical site verified with patient. IV access established.

## 2022-06-03 NOTE — ANESTHESIA POSTPROCEDURE EVALUATION
Patient: Javon Roa    Procedure Summary     Date: 06/03/22 Room / Location:  ENDOSCOPIC ULTRASOUND ROOM / SURGERY [facility]    Anesthesia Start: 0836 Anesthesia Stop: 0930    Procedures:       GASTROSCOPY (N/A Esophagus)      EGD, WITH ASPIRATION BIOPSY - WITH INTERPACE (Esophagus) Diagnosis: (PANCREATIC CYSTIC LESION)    Surgeons: Angel Lizama M.D. Responsible Provider: Cory Duke M.D.    Anesthesia Type: MAC ASA Status: 2          Final Anesthesia Type: MAC  Last vitals  BP   Blood Pressure : 128/73    Temp   36.2 °C (97.2 °F)    Pulse   (Abnormal) 54   Resp   14    SpO2   93 %      Anesthesia Post Evaluation    Patient location during evaluation: PACU  Patient participation: complete - patient participated  Level of consciousness: awake and alert  Pain score: 0    Airway patency: patent  Anesthetic complications: no  Cardiovascular status: hemodynamically stable  Respiratory status: acceptable  Hydration status: euvolemic    PONV: none          No complications documented.     Nurse Pain Score: 0 (NPRS)

## 2022-06-03 NOTE — ANESTHESIA PREPROCEDURE EVALUATION
Case: [id number] Date/Time: 06/03/22 0845    Procedures:       GASTROSCOPY      EGD, WITH ENDOSCOPIC US - UPPER RADIAL      EGD, WITH ASPIRATION BIOPSY - WITH INTERPACE    Pre-op diagnosis: PANCREATIC CYSTIC LESION    Location:  ENDOSCOPIC ULTRASOUND ROOM / SURGERY [facility]    Surgeons: Angel Lizama M.D.      65yo male for EGD/ EUS  H/o HTN and kidney stones      Relevant Problems   ANESTHESIA   (positive) MACEY (obstructive sleep apnea)      CARDIAC   (positive) CAD (coronary artery disease)   (positive) Coronary artery calcification seen on CAT scan   (positive) Essential hypertension, benign         (positive) Nephrolithiasis       Physical Exam    Airway   Mallampati: I  TM distance: >3 FB  Neck ROM: full       Cardiovascular - normal exam  Rhythm: regular  Rate: normal  (-) murmur     Dental - normal exam           Pulmonary - normal exam  Breath sounds clear to auscultation     Abdominal    Neurological - normal exam                 Anesthesia Plan    ASA 2       Plan - MAC               Induction: intravenous    Postoperative Plan: Postoperative administration of opioids is intended.    Pertinent diagnostic labs and testing reviewed    Informed Consent:    Anesthetic plan and risks discussed with patient.    Use of blood products discussed with: patient whom consented to blood products.

## 2022-06-03 NOTE — ANESTHESIA TIME REPORT
Anesthesia Start and Stop Event Times     Date Time Event    6/3/2022 08:15 AM Ready for Procedure    6/3/2022 08:36 AM Anesthesia Start    6/3/2022 09:30 AM Anesthesia Stop        Responsible Staff  06/03/22    Name Role Begin End    Yakelin Scanlon M.D. Anesthesiologist 06/03/22 08:36 AM 06/03/22 08:59 AM    Cory Duke M.D. Anesthesiologist 06/03/22 08:59 AM 06/03/22 09:30 AM        Overtime Reason:  no overtime (within assigned shift)    Comments:

## 2022-06-03 NOTE — PROGRESS NOTES
Indication:Pancreatic cyst.   Risks, benefits, and alternatives were discussed with consenting person(s). Consenting person(s) were given an opportunity to ask questions and discuss other options. Risks including but not limited to failed or incomplete EGD EUS with FNA, ineffective therapy, pancreatitis (with potential future complications), perforation, infection, bleeding, missed lesion(s), missed diagnosis, cardiac and/or pulmonary event, aspiration, stroke, possible need for surgery, hospitalization possibly prolonged, discomfort, unsuccessful and/or incomplete procedure, possible need for repeat procedures and/or additional testings, damage to adjacent organs and/or vascular structures, medication reaction, disability, death, and other adverse events possibly life-threatening. Discussion was undertaken with Layman's terms. Consenting persons stated understanding and acceptance of these risks, and wished to proceed. Consent was given in clear state of mind.

## 2022-06-06 LAB
BACTERIA WND AEROBE CULT: NORMAL
GRAM STN SPEC: NORMAL
SIGNIFICANT IND 70042: NORMAL
SITE SITE: NORMAL
SOURCE SOURCE: NORMAL

## 2022-06-08 LAB
BACTERIA SPEC ANAEROBE CULT: NORMAL
SIGNIFICANT IND 70042: NORMAL
SITE SITE: NORMAL
SOURCE SOURCE: NORMAL

## 2022-06-28 LAB
FUNGUS SPEC CULT: NORMAL
FUNGUS SPEC FUNGUS STN: NORMAL
SIGNIFICANT IND 70042: NORMAL
SITE SITE: NORMAL
SOURCE SOURCE: NORMAL

## 2022-07-31 LAB
FINAL REPORT Q0603: NORMAL
PRELIMINARY RPT Q0601: NORMAL
RHODAMINE-AURAMINE STN SPEC: NORMAL

## 2024-11-27 ENCOUNTER — APPOINTMENT (RX ONLY)
Dept: URBAN - NONMETROPOLITAN AREA CLINIC 3 | Facility: CLINIC | Age: 69
Setting detail: DERMATOLOGY
End: 2024-11-27

## 2024-11-27 DIAGNOSIS — D22 MELANOCYTIC NEVI: ICD-10-CM

## 2024-11-27 DIAGNOSIS — L73.9 FOLLICULAR DISORDER, UNSPECIFIED: ICD-10-CM

## 2024-11-27 DIAGNOSIS — L738 OTHER SPECIFIED DISEASES OF HAIR AND HAIR FOLLICLES: ICD-10-CM

## 2024-11-27 DIAGNOSIS — L20.89 OTHER ATOPIC DERMATITIS: ICD-10-CM

## 2024-11-27 DIAGNOSIS — L663 OTHER SPECIFIED DISEASES OF HAIR AND HAIR FOLLICLES: ICD-10-CM

## 2024-11-27 PROBLEM — L30.9 DERMATITIS, UNSPECIFIED: Status: ACTIVE | Noted: 2024-11-27

## 2024-11-27 PROBLEM — D48.5 NEOPLASM OF UNCERTAIN BEHAVIOR OF SKIN: Status: ACTIVE | Noted: 2024-11-27

## 2024-11-27 PROCEDURE — ? BIOPSY BY SHAVE METHOD

## 2024-11-27 PROCEDURE — ? SHAVE REMOVAL

## 2024-11-27 PROCEDURE — ? COUNSELING

## 2024-11-27 PROCEDURE — 11301 SHAVE SKIN LESION 0.6-1.0 CM: CPT

## 2024-11-27 PROCEDURE — ? PRESCRIPTION

## 2024-11-27 PROCEDURE — ? PRESCRIPTION MEDICATION MANAGEMENT

## 2024-11-27 PROCEDURE — 99203 OFFICE O/P NEW LOW 30 MIN: CPT | Mod: 25

## 2024-11-27 PROCEDURE — 11102 TANGNTL BX SKIN SINGLE LES: CPT | Mod: 59

## 2024-11-27 PROCEDURE — 11103 TANGNTL BX SKIN EA SEP/ADDL: CPT

## 2024-11-27 RX ORDER — CLOBETASOL PROPIONATE 0.5 MG/G
CREAM TOPICAL
Qty: 60 | Refills: 0 | Status: ERX | COMMUNITY
Start: 2024-11-27

## 2024-11-27 RX ORDER — HYDROXYZINE HYDROCHLORIDE 50 MG/1
TABLET, FILM COATED ORAL
Qty: 14 | Refills: 0 | Status: ERX | COMMUNITY
Start: 2024-11-27

## 2024-11-27 RX ORDER — DOXYCYCLINE HYCLATE 100 MG/1
CAPSULE, GELATIN COATED ORAL
Qty: 14 | Refills: 0 | Status: ERX | COMMUNITY
Start: 2024-11-27

## 2024-11-27 RX ORDER — PREDNISONE 10 MG/1
TABLET ORAL
Qty: 63 | Refills: 0 | Status: ERX | COMMUNITY
Start: 2024-11-27

## 2024-11-27 RX ADMIN — PREDNISONE: 10 TABLET ORAL at 00:00

## 2024-11-27 RX ADMIN — CLOBETASOL PROPIONATE: 0.5 CREAM TOPICAL at 00:00

## 2024-11-27 RX ADMIN — HYDROXYZINE HYDROCHLORIDE: 50 TABLET, FILM COATED ORAL at 00:00

## 2024-11-27 RX ADMIN — DOXYCYCLINE HYCLATE: 100 CAPSULE, GELATIN COATED ORAL at 00:00

## 2024-11-27 ASSESSMENT — LOCATION DETAILED DESCRIPTION DERM
LOCATION DETAILED: RIGHT LATERAL SUPERIOR CHEST
LOCATION DETAILED: LEFT LATERAL SUPERIOR CHEST
LOCATION DETAILED: LEFT POSTERIOR SHOULDER

## 2024-11-27 ASSESSMENT — LOCATION ZONE DERM
LOCATION ZONE: ARM
LOCATION ZONE: TRUNK

## 2024-11-27 ASSESSMENT — LOCATION SIMPLE DESCRIPTION DERM
LOCATION SIMPLE: LEFT SHOULDER
LOCATION SIMPLE: CHEST

## 2024-11-27 ASSESSMENT — ITCH NUMERIC RATING SCALE: HOW SEVERE IS YOUR ITCHING?: 7

## 2024-11-27 NOTE — PROCEDURE: MIPS QUALITY
Quality 394a: Meningococcal Immunizations For Adolescents: Patient did not have one dose of meningococcal vaccine on or between the patient's 11th and 13th birthdays.
Quality 226: Preventive Care And Screening: Tobacco Use: Screening And Cessation Intervention: Patient screened for tobacco use and is an ex/non-smoker
Detail Level: Simple
Quality 394b: Td/Tdap Immunizations For Adolescents: Patient did not have one Tdap or one Td vaccine on or between the patient's 10th and 13th birthdays.
Quality 130: Documentation Of Current Medications In The Medical Record: Current Medications Documented
Quality 431: Preventive Care And Screening: Unhealthy Alcohol Use - Screening: Patient not identified as an unhealthy alcohol user when screened for unhealthy alcohol use using a systematic screening method
Additional Notes: Taking aspirin, lisinopril, rosuvastatin
Quality 394c: Hpv Vaccine For Adolescents: Patient did not have at least two HPV vaccines (with at least 146 days between the two) OR three HPV vaccines on or between the patient’s 9th and 13th birthdays.

## 2024-11-27 NOTE — PROCEDURE: PRESCRIPTION MEDICATION MANAGEMENT
Plan: Follow up in one week
Render In Strict Bullet Format?: No
Detail Level: Zone
Initiate Treatment: Clobetasol, hydroxyzine, doxycycline, prednisone taper

## 2024-11-27 NOTE — PROCEDURE: SHAVE REMOVAL
Medical Necessity Information: It is in your best interest to select a reason for this procedure from the list below. All of these items fulfill various CMS LCD requirements except the new and changing color options.
Medical Necessity Clause: This procedure was medically necessary because the lesion that was treated was:
Lab: [id number]
Lab Facility: [id number]
Body Location Override (Optional - Billing Will Still Be Based On Selected Body Map Location If Applicable): left scapula
Detail Level: Detailed
Was A Bandage Applied: Yes
Size Of Lesion In Cm (Required): 0.8
X Size Of Lesion In Cm (Optional): 0
Depth Of Shave: dermis
Biopsy Method: Dermablade
Anesthesia Type: 1% lidocaine with epinephrine
Hemostasis: Drysol
Wound Care: Petrolatum
Render Path Notes In Note?: No
Consent was obtained from the patient. The risks and benefits to therapy were discussed in detail. Specifically, the risks of infection, scarring, bleeding, prolonged wound healing, incomplete removal, allergy to anesthesia, nerve injury and recurrence were addressed. Prior to the procedure, the treatment site was clearly identified and confirmed by the patient. All components of Universal Protocol/PAUSE Rule completed.
Post-Care Instructions: I reviewed with the patient in detail post-care instructions. Patient is to keep the biopsy site dry overnight, and then apply bacitracin twice daily until healed. Patient may apply hydrogen peroxide soaks to remove any crusting.
Notification Instructions: Patient will be notified of pathology results. However, patient instructed to call the office if not contacted within 2 weeks.
Billing Type: Third-Party Bill

## (undated) DEVICE — SYRINGE 12 CC LUER TIP - (80/BX) OBSOLETE ITEM

## (undated) DEVICE — TUBE E-T HI-LO CUFF 6.5MM (10EA/BX)

## (undated) DEVICE — KIT CUSTOM PROCEDURE SINGLE FOR ENDO  (15/CA)

## (undated) DEVICE — LACTATED RINGERS INJ 1000 ML - (14EA/CA 60CA/PF)

## (undated) DEVICE — CANISTER SUCTION RIGID RED 1500CC (40EA/CA)

## (undated) DEVICE — GLOVE, LITE (PAIR)

## (undated) DEVICE — SYRINGE SAFETY 3 ML 18 GA X 1 1/2 BLUNT LL (100/BX 8BX/CA)

## (undated) DEVICE — GOWN SURGEONS LARGE - (32/CA)

## (undated) DEVICE — SENSOR SPO2 ADULT LNCS ADTX (20/BX) ORDER ITEM #19593

## (undated) DEVICE — TUBE SUCTION YANKAUER  1/4 X 6FT (20EA/CA)"

## (undated) DEVICE — TUBE CONNECTING SUCTION - CLEAR PLASTIC STERILE 72 IN (50EA/CA)

## (undated) DEVICE — TUBE E-T HI-LO CUFF 7.5MM (10EA/PK)

## (undated) DEVICE — KIT  I.V. START (100EA/CA)

## (undated) DEVICE — SYRINGE SAFETY 5 ML 18 GA X 1-1/2 BLUNT LL (100/BX 4BX/CA)

## (undated) DEVICE — NEPTUNE 4 PORT MANIFOLD - (20/PK)

## (undated) DEVICE — SYRINGE SAFETY 10 ML 18 GA X 1 1/2 BLUNT LL (100/BX 4BX/CA)

## (undated) DEVICE — FORCEP RADIAL JAW 4 STANDARD CAPACITY W/NEEDLE 240CM (40EA/BX)

## (undated) DEVICE — MASK WITH FACE SHIELD (25/BX 4BX/CA)

## (undated) DEVICE — TUBE E-T HI-LO CUFF 8.5MM (10EA/PK)

## (undated) DEVICE — CATHETER IV SAFETY 22 GA X 1 (50EA/BX)

## (undated) DEVICE — TUBE E-T HI-LO CUFF 8.0MM (10EA/PK)

## (undated) DEVICE — TUBE E-T HI-LO CUFF 7.0MM (10EA/PK)

## (undated) DEVICE — TUBING CLEARLINK DUO-VENT - C-FLO (48EA/CA)

## (undated) DEVICE — WATER IRRIGATION STERILE 1000ML (12EA/CA)

## (undated) DEVICE — SYRINGE DISP. 60 CC LL - (30/BX, 12BX/CA)**WHEN THESE ARE GONE ORDER #500206**

## (undated) DEVICE — BITE BLOCK ADULT 60FR (100EA/CA)

## (undated) DEVICE — TOWEL STOP TIMEOUT SAFETY FLAG (40EA/CA)

## (undated) DEVICE — KIT ANESTHESIA W/CIRCUIT & 3/LT BAG W/FILTER (20EA/CA)

## (undated) DEVICE — CATHETER IV SAFETY 20 GA X 1-1/4 (50/BX)